# Patient Record
Sex: FEMALE | Race: WHITE | NOT HISPANIC OR LATINO | Employment: FULL TIME | URBAN - METROPOLITAN AREA
[De-identification: names, ages, dates, MRNs, and addresses within clinical notes are randomized per-mention and may not be internally consistent; named-entity substitution may affect disease eponyms.]

---
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
## 2022-01-21 ENCOUNTER — PATIENT MESSAGE (OUTPATIENT)
Dept: HEMATOLOGY/ONCOLOGY | Facility: CLINIC | Age: 22
End: 2022-01-21
Payer: COMMERCIAL

## 2022-01-26 ENCOUNTER — TELEPHONE (OUTPATIENT)
Dept: HEMATOLOGY/ONCOLOGY | Facility: CLINIC | Age: 22
End: 2022-01-26
Payer: COMMERCIAL

## 2022-01-26 NOTE — TELEPHONE ENCOUNTER
"My office will contact Dinorah to schedule her for a virtual visit with me.    Dinorah Deleon ("Dinorah") verbally provided me with her permission for me to phone/message her relative (mother, MRN [id number]) and let said relative know that Dniorah is seeing me for genetics and try to obtain permission from said relative for me to access and share with Dinorah said relative's genetic testing information and the family history information.          "

## 2022-01-31 ENCOUNTER — TELEPHONE (OUTPATIENT)
Dept: HEMATOLOGY/ONCOLOGY | Facility: CLINIC | Age: 22
End: 2022-01-31
Payer: COMMERCIAL

## 2022-01-31 NOTE — TELEPHONE ENCOUNTER
----- Message from Salomon Meyers, DNP sent at 1/26/2022  4:58 PM CST -----  Elvi Carmona,    Can you please call pt to set up a virtual visit with me at any time that works for her when I don't already have a pt? Can overbook me.     Thank you,  Salomon

## 2022-02-02 ENCOUNTER — DOCUMENTATION ONLY (OUTPATIENT)
Dept: HEMATOLOGY/ONCOLOGY | Facility: CLINIC | Age: 22
End: 2022-02-02
Payer: COMMERCIAL

## 2022-02-02 NOTE — PROGRESS NOTES
Dinorah's mother Lisa (MRN [id number]) provided me, in writing via [facility] message, with her permission for me to share with Dinorah Gonzalez's personal and family health information that Lisa previously provided to me pertaining to cancer genetics (i.e., her and her family's cancer history and other pertinent health information).    Dinorah is scheduled to meet with me next week, so the abovementioned information will be utilized at that time.

## 2022-02-04 ENCOUNTER — PATIENT MESSAGE (OUTPATIENT)
Dept: HEMATOLOGY/ONCOLOGY | Facility: CLINIC | Age: 22
End: 2022-02-04
Payer: COMMERCIAL

## 2022-02-08 ENCOUNTER — OFFICE VISIT (OUTPATIENT)
Dept: HEMATOLOGY/ONCOLOGY | Facility: CLINIC | Age: 22
End: 2022-02-08
Payer: COMMERCIAL

## 2022-02-08 DIAGNOSIS — Z84.81 FAMILY HISTORY OF BRCA2 GENE POSITIVE: ICD-10-CM

## 2022-02-08 DIAGNOSIS — Z71.83 ENCOUNTER FOR NONPROCREATIVE GENETIC COUNSELING: Primary | ICD-10-CM

## 2022-02-08 PROCEDURE — 99203 OFFICE O/P NEW LOW 30 MIN: CPT | Mod: 95,,, | Performed by: NURSE PRACTITIONER

## 2022-02-08 PROCEDURE — 99203 PR OFFICE/OUTPT VISIT, NEW, LEVL III, 30-44 MIN: ICD-10-PCS | Mod: 95,,, | Performed by: NURSE PRACTITIONER

## 2022-02-08 NOTE — PROGRESS NOTES
"Cancer Genetics  Hereditary/High Risk Clinic  Department of Hematology and Oncology  [facility]    Date of Service:  22  Provider:  Salomon Meyers DNP    Patient Information  Name: Dinorah Deleon  : 2000  MRN: [id number]     Referring Provider    Aaareferral Self  No address on file    Televisit Information  The patient location is: [place].  The chief complaint leading to consultation is: as below.  Visit type: audiovisual. Face-to-face time with patient: approximately 19 minutes.  Approximately 31 minutes in total were spent on this encounter, which includes face-to-face time and non-face-to-face time preparing to see the patient (e.g., review of tests), obtaining and/or reviewing separately obtained history, documenting clinical information in the electronic or other health record, independently interpreting results (not separately reported) and communicating results to the patient/family/caregiver, or care coordination (not separately reported).  Each patient to whom he or she provides medical services by telemedicine is:  (1) informed of the relationship between the physician and patient and the respective role of any other health care provider with respect to management of the patient; and (2) notified that he or she may decline to receive medical services by telemedicine and may withdraw from such care at any time.  Notes:     SUBJECTIVE      Chief Complaint: Genetic Evaluation    History of Present Illness (HPI):  Dinorah Deleon ("Dinorah"), 21 y.o., assigned female sex at birth, is new to the [facility] Department of H[facility]ology and On[facility] and to me.  She presents today for genetic cancer risk assessment.    Focused Medical History    Germline cancer genetic testing:  No   Cancer:     No   Colon polyp:    No h/o colonoscopy   Other benign tumor/mass:  No   Pancreatitis:    No    Breast-Specific History    Height:  5'6"   Weight:  125 lb   Breast density per BI-RADS:  No h/o " MMG   Age at menarche:  13 years   Age at first live birth:  N/A   Menopause:  premenopausal   HRT usage:  never   Personal history of breast biopsy:  no   History of XRT to chest wall:  no    Focused Surgical History   Reproductive organs intact    Ancestry   Ashkenazi Bahai ancestry:    No    Focused Family History   Consanguinity in ancestors:    No   Germline cancer genetic testing    in blood relatives:    Yes  o Dinorah's mother, MRN [id number]:  - Integrated BRACAnalysis + [employer] Hereditary Cancer Update Test, [employer], 02/2021   Accession #:  [id number]   RESULT:  POSITIVE  o BRCA2 c.5992C>T ([id number]*) heterozygous DELETERIOUS   Genes Analyzed:  APC, BRAEDEN, AXIN2, BARD1, BMPR1A, BRIP1, CDH1, CDK4, CDKN2A, CHEK2, EPCAM (large rearrangement only), HOXB13 (sequencing only), GALNT12, MLH1, MSH2, MSH3 (excluding repetitive portions of exon 1), MSH6, MUTYH, NBN, NTHL1, PALB2, PMS2, PTEN, RAD51C, RAD51D, RNF43, RPS20, SMAD4, STK11, TP53. Sequencing was performed for select regions of POLE and POLD1, and large rearrangement analysis was performed for select regions of GREM1 (see technical specifications).    Family Cancer Pedigree     MG also reportedly BRCA2 mutation-positive.   Other than noted, no known history of cancer in relatives.   Other than noted, no known family history of colon polyps.   Unknown or limited knowledge of the following relatives:  Some maternal extended family.    Review of Systems   See HPI.       OBJECTIVE      History reviewed. No pertinent past medical history.     Patient Active Problem List    Diagnosis Date Noted    Family history of BRCA2 gene positive 02/08/2022     Physical Exam  Significantly limited secondary to the inherent nature of a virtual visit.  Very pleasant patient.  Constitutional       Appearance:  She appears to be in no distress.   Neurological     Mental Status:  She is alert and oriented.  Psychiatric        Mood and Affect:  She  has a normal mood and affect.     Thought Content:  Thought content is normal.         Speech:  Speech is normal.     Behavior:  Behavior is normal.     Judgment:  Judgment is normal.   Genetics-specific     It is my assessment that the patient is ready to proceed with cancer-genetic testing from a psychosocial perspective.    ClinVar    NM_000059.4(BRCA2):c.5992C>T (p.[id number])  Interpretation:  Pathogenic?    COUNSELING      Causes of cancer    Germline cancer genetic testing is the testing of genes associated with cancer, known as cancer susceptibility genes.  Just as these genes are inherited from parents, mutations in these genes can be inherited, as well.  A mutation in a cancer susceptibility gene adversely affects the gene's ability to prevent cancer; therefore, carriers of cancer susceptibility gene mutations may be at increased risk for certain cancers.    Only a small percentage of cancers are caused by a cancer susceptibility gene mutation, meaning the cancer is genetic/hereditary; rather, most cancers are sporadic.  Causes of sporadic cancers may include environmental risk factors, lifestyle risk factors, and non-modifiable risk factors.  It is important to note that members of a family often share not only their genetics but also risk factors including environmental and lifestyle risk factors, so cancers can be familial.    Potential results of genetic testing, and their implications    Potential results of genetic testing include positive, negative, and variant of unknown significance (VUS).     A positive result indicates the presence of at least one clinically significant mutation, and the patient's associated cancer risks vary depending upon the cancer susceptibility gene(s) in which there is/are a mutation(s).  With a positive result, in some cases, depending upon the specific result and the patient's clinical history, modified risk management may be recommended, including measures for risk  reduction and/or surveillance; however, modified management is not always an option.     A negative result indicates that no clinically significant mutations were identified in the gene(s) tested.     A VUS indicates that there is not presently enough data for the laboratory to make a determination as to whether the variant is clinically significant.  VUSs are not typically acted upon clinically.      The ability to interpret the meaning of a negative genetic testing result in genes associated with cancer with which the patient has not personally been affected, when done prior to testing the appropriate affected relative(s), is significantly limited.  A negative result in the patient does not indicate that she cannot develop cancer, and, in fact, the patient may even be at increased risk for cancer based on shared risk factors with affected relatives.      Genetic mutation inheritance    If Dinorah tests positive for a mutation, her first-degree relatives would each have a 50% chance of having the same mutation, and other, more distantly related blood-relatives would also be at risk of having the same mutation.      BRCA2    There is a 50% chance that Dinorah has inherited her mother's germline BRCA2 mutation.  Germline BRCA2 mutations are associated with increased risk of breast cancer, ovarian cancer, pancreatic cancer, and melanoma of the eyes and the skin.  Germline BRCA2 mutations are also associated with the autosomal-recessively inherited, rare but serious condition Fanconi anemia, which can develop when both parents have one BRCA2 mutation--In that case, there is a 25% chance that the offspring will inherit both parents' BRCA2 mutations.  If Dinorah tests positive for a BRCA2 mutation, reproductive implications will be further discussed.     Genetic discrimination    The Genetic Information Nondiscrimination Act (DANE) is U[place]. federal legislation that provides some protections against use of an  individual's genetic information by their health insurer and by their employer.  Title I of DANE prohibits most health insurers from utilizing an individual's genetic information to make decisions regarding insurance eligibility or premium charges.  Title II of DANE prohibits covered entities, including employers, from requesting the genetic information of employees and applicants.  DANE does not protect individuals from genetic discrimination toward health insurance obtained through a job with the  or through the Federal Employees Health Benefits Plan; from genetic discrimination by employers with fewer than 15 employees or if employed by the ; or from genetic discrimination by any other type of policies/entities, including but not limited to life insurance, disability insurance, long-term care insurance,  benefits, and  Health Services benefits.     Genetic testing logistics    An outside laboratory would perform the testing after a blood sample is collected here at the [facility] or a saliva sample is collected by the patient at home.  With genetic testing, there is a potential for the patient to incur out-of-pocket costs.       Assessment/Plan    Based on the information provided by Dinorah, she meets current criteria for genetic testing of BRCA2 according to current clinical guidelines given her mother carries a mutation in said gene.  Of note, Dinorah has limited family structure in her paternal lineage.  Offered Dinorah germline cancer-genetic testing at this time versus deferring testing at this time or declining testing altogether.  Dinorah desires to proceed with germline cancer-genetic testing at this time and has verbally provided her informed consent to proceed.  Various test panel options were discussed, and she prefers to test a broad panel of genes.  Dinorah is aware that I will ship her saliva kit to her once I receive her completed informed-consent  form.    Questions were encouraged and answered to the patient's satisfaction, and she verbalized understanding of information and agreement with the plan.       ASSESSMENT/PLAN      Dinorah was seen today for genetic evaluation.    Diagnoses and all orders for this visit:    Encounter for nonprocreative genetic counseling  Genetic cancer risk assessment and pre-test cancer genetic counseling were conducted.  Based on the information provided by Dinorah, she meets current criteria for genetic testing of BRCA2 according to current clinical guidelines given her mother carries a mutation in said gene.  Of note, Dinorah has limited family structure in her paternal lineage.  Offered Dinorah germline cancer-genetic testing at this time versus deferring testing at this time or declining testing altogether.  Dinorah desires to proceed with germline cancer-genetic testing at this time and has verbally provided her informed consent to proceed.  Various test panel options were discussed, and she prefers to test a broad panel of genes.  Dinorah is aware that I will ship her saliva kit to her once I receive her completed informed-consent form.    Family history of BRCA2 gene positive  As above.    Follow-up:  Follow up for post-test genetic counseling, if indicated or if patient desires.    Salomon Meyers, DNP, APRN, FNP-BC, AOCNP  Nurse Practitioner, Hereditary/High Risk Clinic  Hematology/Oncology, [facility]

## 2022-02-08 NOTE — PROGRESS NOTES
REFERENCES     1. National Center for Biotechnology Information. ClinVar; [[id number]], https://www.ncbi.nlm.nih.gov/clinvar/variation/BRT387881674.10 (accessed Feb. 8, 2022).  2. National Comprehensive Cancer Network (NCCN). (2021). Genetic/familial high-risk assessment: Breast, ovarian, and pancreatic. NCCN Clinical Practice Guidelines in Oncology (NCCN Guidelines), Version 1.2022.  3. National Comprehensive Cancer Network (NCCN). (2021). Breast cancer screening and diagnosis. NCCN Clinical Practice Guidelines in Oncology (NCCN Guidelines), Version 1.2021.

## 2022-03-17 ENCOUNTER — PATIENT MESSAGE (OUTPATIENT)
Dept: HEMATOLOGY/ONCOLOGY | Facility: CLINIC | Age: 22
End: 2022-03-17
Payer: COMMERCIAL

## 2022-03-23 ENCOUNTER — TELEPHONE (OUTPATIENT)
Dept: HEMATOLOGY/ONCOLOGY | Facility: CLINIC | Age: 22
End: 2022-03-23
Payer: COMMERCIAL

## 2022-03-23 DIAGNOSIS — Z84.81 FAMILY HISTORY OF BRCA2 GENE POSITIVE: Primary | ICD-10-CM

## 2022-03-23 NOTE — TELEPHONE ENCOUNTER
"Advised Dinorah Deleon ("Dinorah") verbally, via telephone, that her mother requested a status update as to Dinorah's genetic testing.  Dinorah verbally provided me with her permission for me to advise her mother MRN ([id number]) in mother's portal as to updates regarding Dinorah's genetic testing status.      "

## 2022-04-28 ENCOUNTER — TELEPHONE (OUTPATIENT)
Dept: HEMATOLOGY/ONCOLOGY | Facility: CLINIC | Age: 22
End: 2022-04-28
Payer: COMMERCIAL

## 2022-04-28 NOTE — TELEPHONE ENCOUNTER
Phoned pt with genetic testing results.  Confirmed pt's email as [email or web address].  Pt requested that her mother not be told of pt's genetic testing results as pt will tell mother herself; assured pt that of course I will honor request.  Advised pt that my office will contact her to set up her post-test visit and that only BRCA1 and BRCA2 have resulted so far and remainder of requested genes still in process.  Pt verbalized understanding and agreement.

## 2022-04-29 ENCOUNTER — TELEPHONE (OUTPATIENT)
Dept: HEMATOLOGY/ONCOLOGY | Facility: CLINIC | Age: 22
End: 2022-04-29
Payer: COMMERCIAL

## 2022-04-29 NOTE — TELEPHONE ENCOUNTER
Spoke with pt to schedule expedited genetics appt. Pt will call back Monday to schedule because she lives in [place] and her mom would like to attend appt.

## 2022-05-02 ENCOUNTER — TELEPHONE (OUTPATIENT)
Dept: HEMATOLOGY/ONCOLOGY | Facility: CLINIC | Age: 22
End: 2022-05-02
Payer: COMMERCIAL

## 2022-05-02 NOTE — TELEPHONE ENCOUNTER
Left vm to schedule genetics appt. Provided call back number.    ----- Message from Esther Haley MA sent at 5/2/2022 11:33 AM CDT -----    ----- Message -----  From: Cassie Garcia  Sent: 5/2/2022  11:31 AM CDT  To: Lu[facility] Staff    Pt#[phone]    Pt is returning call regarding scheduling genetic testing

## 2022-05-03 ENCOUNTER — TELEPHONE (OUTPATIENT)
Dept: HEMATOLOGY/ONCOLOGY | Facility: CLINIC | Age: 22
End: 2022-05-03
Payer: COMMERCIAL

## 2022-05-03 ENCOUNTER — PATIENT MESSAGE (OUTPATIENT)
Dept: HEMATOLOGY/ONCOLOGY | Facility: CLINIC | Age: 22
End: 2022-05-03
Payer: COMMERCIAL

## 2022-05-03 NOTE — TELEPHONE ENCOUNTER
Phoned pt with additional genetic testing results -- No answer, and VM box did not .  Will message pt.

## 2022-06-13 ENCOUNTER — DOCUMENTATION ONLY (OUTPATIENT)
Dept: HEMATOLOGY/ONCOLOGY | Facility: CLINIC | Age: 22
End: 2022-06-13
Payer: COMMERCIAL

## 2022-06-16 ENCOUNTER — TELEPHONE (OUTPATIENT)
Dept: HEMATOLOGY/ONCOLOGY | Facility: CLINIC | Age: 22
End: 2022-06-16
Payer: COMMERCIAL

## 2022-06-16 PROBLEM — Z15.09 MONOALLELIC MUTATION OF BRCA2 GENE: Status: ACTIVE | Noted: 2022-06-16

## 2022-06-16 PROBLEM — Z15.89: Status: ACTIVE | Noted: 2022-06-16

## 2022-06-16 PROBLEM — Z15.01 MONOALLELIC MUTATION OF BRCA2 GENE: Status: ACTIVE | Noted: 2022-06-16

## 2022-06-16 NOTE — PROGRESS NOTES
"Cancer Genetics  Hereditary and High-Risk Clinic  Department of Hematology and Oncology  [facility]    Date of Service:  22  Visit Provider:  Salomon Meyers DNP  Collaborating Physician:  Savanah Casey MD    Patient Information  Name: Dinorah Deleon  : 2000  MRN: [id number]     SUBJECTIVE      Chief Complaint: Results    History of Present Illness (HPI):  Dinorah Deleon ("Dinorah"), 21 y.o., assigned female sex at birth, returns today for post-test cancer genetic counseling.  She underwent hereditary cancer susceptibility gene testing after meeting with me earlier this year, and this revealed that she carries two pathogenic gene variants--one in her BRCA2 gene, and one in her VHL gene.    Focused Medical History   Germline genetic testing:  Yes, once - see below           Genes analyzed:  AIP, ALK, APC*, BRAEDEN*, AXIN2, BAP1, BARD1, BLM, BMPR1A, BRCA1, BRCA2, BRIP1, CASR, CDC73, CDH1, CDK4, CDKN1B, CDKN1C, CDKN2A (p14ARF), CDKN2A ([id number]), CEBPA, CHEK2, CTNNA1, DICER1*, DIS3L2, EGFR, EPCAM*, FH*, FLCN, GATA2, GPC3*, GREM1*, HOXB13, HRAS, KIT, MAX*, MEN1*, MET*, MITF*, MLH1*, MSH2*, MSH3*, MSH6*, MUTYH, NBN, NF1*, NF2, NTHL1, PALB2, PDGFRA, PHOX2B*, PMS2*, POLD1*, POLE, POT1, OKRIG5V, PTCH1, PTEN*, RAD50, RAD51C, RAD51D, RB1*, RECQL4*, RET, RUNX1, SDHA*, SDHAF2, SDHB, SDHC*, SDHD, SMAD4, SMARCA4, SMARCB1, SMARCE1, STK11, SUFU, TERC, TERT, UTII434, TP53, TSC1*, TSC2, VHL, WRN*, WT1      Cancer:  No   Other benign tumor/mass:  No   Pancreatitis:  No    Focused Surgical History   Reproductive organs intact    Family Cancer History/Pedigree     Hereditary cancer genetic testing:  Yes - see in pedigree   Benign tumors:  None known, but would not know    Review of Systems   See HPI.     When questioned about and advised as to tinnitus, patient endorsed occasional ringing in either ear, occurring for as long as she can remember and not getting more frequent or intense as " of late, and not more significant in one ear than the other.  o The clinical course is not suggestive of pathology given its chronic, intermittent, bilateral, stable nature.   Recent or remote history of hearing loss, vertigo, dizziness, lightheadedness, lower abdominal or pelvic pain, dyspareunia, menstrual periods lasting for longer than 7 days, menstrual periods requiring pad/tampon change more frequently than every 2 hours, or passing clot with menstrual period.   Endorses asymmetrical breasts, longstanding, but more significant within past year.   Denies having been experiencing pelvic symptoms, lower abdominal/gastrointestinal (GI) symptoms, upper GI symptoms, or unexplained weight loss or gain.   Denies having been experiencing balance issue.   Denies ever having had the features of VHL disease indicated below in the Assessment/Plan section.   Denies having been experiencing any other symptoms, from head to toe.     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  Unaccompanied in exam room.  Preferred to have relative in waiting room.  Vitals signs:  Reviewed:  Vitals:    06/20/22 1145   PainSc: 0-No pain   Distress score:  Reviewed: (1/10).  Constitutional      Appearance:  She appears well developed and well nourished. No distress.   Pulmonary     Effort:  Pulmonary effort is normal.   Neurological     Mental Status:  She is alert and oriented.     Coordination:  Coordination is normal.   Psychiatric         Mood and Affect:  She has a normal mood and affect.     Thought Content:  Thought content is normal.         Speech:  Speech is normal.     Behavior:  Behavior is normal.     Judgment:   "Judgment is normal.     ASSESSMENT/PLAN      BRCA2 Gene Mutation    Results  · Dinorah carries a single (i.e., heterozygous or monoallelic) pathogenic variant (henceforth herein referred to as a mutation) in her BRCA2 gene; specifically, she carries BRCA2 c.5992C>T ([id number]*), heterozygous.    Risks  · BRCA2 mutations are associated with the following risks:  · Autosomal-dominantly inherited risks, meaning that a mutation on only one copy of the BRCA2 gene (and not on both copies of the BRCA2 gene) is needed for the risks to apply:  · Female breast cancer:  Absolute risk of >60%; estimated cumulative lifetime risk of approximately 69%,  Estimated cumulative lifetime risk of a second, contralateral breast cancer of 26%.  Predisposition to estrogen receptor-positive (ER+) disease.  · Male breast cancer:  Estimated cumulative lifetime risk of approximately 7%-8%.  · Prostate cancer:  Estimated cumulative lifetime risk of approximately 60%.  · Ovarian, fallopian tube, and primary peritoneal cancers:  Absolute risk of approximately 13%-29%; estimated cumulative lifetime risk by age 70 is approximately 20%.  · Pancreatic cancer:  Absolute risk of approximately 5%-10%.  · Ocular (eye) melanoma:  Relative risk is very high compared to non-mutation carriers; however, absolute risk remains very low.  · Cutaneous (skin) melanoma:  "Some" evidence of an association.   · Less well established associations:  · Leukemia:  One study demonstrated that women carrying a BRCA2 mutation have an increased risk for leukemia, particularly after chemotherapy.  · Gastric cancer:  One database has demonstrated that BRCA2 mutation carriers with a family history of breast and ovarian cancer have an increased risk of developing gastric cancer by age 70.  · Autosomal-recessively inherited risk, meaning that both copies of the BRCA2 gene need to have a mutation in order for the risk to apply:  · Fanconi anemia, a condition characterized by " developmental abnormalities of major organ systems, early-onset failure of the bone marrow, and high predisposition to cancer, with a cumulative probability of developing a cancer of 97% by age 6.    Risk Management  · Female breast cancer:  If breast(s) remain intact:  1. Breast awareness, including periodic (monthly is generally an appropriate interval) breast self-exam (BSE), with prompt reporting to the breast speciality any changes or concerns.  Breast awareness is to begin at age 18.  2. Clinical breast exam (CBE), every 6 months, starting at age 25.  3. Breast MRI with contrast, annually, starting at age 25; this is to be started earlier if a breast cancer diagnosis before age 30 is present in the family, typically starting at 5-10 years younger than the earliest diagnosis.  4. Mammogram with tomosynthesis, annually, starting at age 30.  5. Consider the option of risk-reducing mastectomy (RRM), which significantly reduces the risk of breast cancer.  6. Consider the option of risk-reducing medication (known as chemoprevention) for breast cancer risk reduction.  This is generally not initiated until at least age 35 and when childbearing is complete.  If status post-bilateral mastectomy:  1. Breast awareness, including periodic (monthly is generally an appropriate interval) breast self-exam (BSE), with prompt reporting to the breast speciality any changes or concerns.  Breast awareness is to begin at age 18.  2. Clinical breast exam (CBE), every 12 months, starting at age 25.  · Ovarian, fallopian tube, and primary peritoneal cancers:  If ovaries or fallopian tubes remain intact:  1. Risk-reducing salpingectomy-oophorectomy (RRSO), when childbearing has been completed.  Typically, RRSO would occur between ages 35 and 40; however, it is reasonable to delay RRSO until age 40-45 unless family history warrants performing RRSO at an earlier age.  While RRSO significantly reduces associated cancer risk, a small risk  "of developing a primary peritoneal cancer remains.  · Of note, in premenopausal females, oophorectomy likely decreases breast cancer risk.  · Consider concurrent hysterectomy at the time of BSO, as females with no uterus are then candidates for estrogen-alone hormone replacement therapy (HRT), which is associated with a decreased breast cancer risk compared to the combined estrogen-and-progesterone HRT that is required if HRT is needed once the ovaries are removed and the uterus remains intact.  2. Prior to RRSO--or if the patient has opted out of RRSO--transvaginal ultrasound (TVUS) combined with measurement of serum CA-125, starting at age 30-35, and typically repeated every 6 months for screening purposes.  This "screening" is of uncertain benefit.  3. Prompt reporting to the gynecology provider any lower abdominal or pelvic symptoms or unexplained weight loss or gain.  4. Consider the option of risk-reducing medication (known as chemoprevention) for ovarian cancer risk reduction, which is commonly an oral contraceptive pill (OCP).    · Pancreatic cancer:  1. If there is a first- or second-degree relative on the same side of the family as the BRCA2 mutation, consider pancreatic cancer screening beginning at age 50 years or at 10 years younger than the youngest pancreatic cancer diagnosis in the family--whichever is earlier.  Such screening would typically consist of annual MRI/MRCP [magnetic resonance cholangiopancreatography] and/or endoscopic ultrasound (EUS).  2. Prompt reporting to the pancreas specialist any upper or lower gastrointestinal (GI) symptoms or unexplained weight loss or gain.  · Ocular (eye) melanoma:    1. Annual eye exam.  2. Minimization of ultraviolet (UV) exposure to the eyes.  · Cutaneous (skin) melanoma:    1. Annual total-body skin exam (TBSE).  2. Minimization of ultraviolet (UV) exposure to the eyes.  3. Prompt reporting to the dermatology provider any changes or concerns noted on " "self-skin exam, which should be assisted for the hard-to-visualize body areas.  · Screening for any potential BRCA2 mutation-associated leukemia or gastric cancer is not indicated at this time.    Reproductive Implications  · When both reproductive partners carry any mutation in their BRCA2 gene, each of their offspring has a 25% chance of inheriting both parents' BRCA2 mutations, thereby being a "biallelic BRCA2 mutation carrier," which places that offspring at significant risk for Fanconi anemia.  · Counseling for reproductive options, which may include prenatal genetic diagnosis and/or assisted reproduction with preimplantation genetic testing, is indicated for individuals/couples expressing concern over future offspring's BRCA2-associated risks.     Risks to Relatives  · It is suspected that this BRCA2 mutation was inherited from a parent; this suspicion can reasonably be validated when one parent tests positive for the same mutation and the other parent tests negative for that mutation.  · Children and siblings of a BRCA2 monoallelic/heterozygous mutation carrier each independently have a 50% chance of inheriting or having inherited the same BRCA2 mutation, thereby having a predisposition to BRCA2 mutation-associated cancers.  · Both males and females can carry, be affected by, and pass down these mutations.  · When passed down, these mutations are passed directly from parent to child and do not "skip generations."  · When both reproductive partners carry any mutation in their BRCA2 gene, each of their offspring has a 25% chance of inheriting both parents' BRCA2 mutations, thereby being at significant risk for Fanconi anemia.  · Counseling for reproductive options, which may include prenatal genetic diagnosis and/or assisted reproduction with preimplantation genetic testing, is indicated for individuals/couples expressing concern over future offspring's BRCA2-associated risks.     Plan  1. For breast cancer " "risk management:  Dinorah is to practice breast awareness as described above.  Given her current age, it is appropriate for Dinorah to undergo annual clinical breast exam (CBE), and for the next few years this can be done by her established gynecology provider, Dr. Jayleen Esqueda, whom patient is to see annually for this purpose (in addition to visits needed for gynecologic care).  Dinorah's last visit with Dr. Esqueda appears to have been a little over two years ago, and Dinorah knows to make an appointment for the very near future.  We will plan to begin every-6-month CBEs sometime between ages 21 and 25.  Risk-reducing mastectomy (RRM) is an option for Dinorah at any point and reduces absolute breast cancer risk substantially but does not completely eliminate it.  Dinorah notes that, while one breast has always been larger than the other, within the past year the difference has increased to "almost a full cup size"; for this reason, she is leaning toward RRM at some point, but not right now, and knows to reach out to me when she is ready to meet with a breast surgeon for RRM consultation.  Although an individual's two breasts can physiologically be different sizes, a consultation with a breast specialist is recommended at this time for evaluation, and if discharged from that clinic, Dinorah can resume annual CBEs with her gynecology provider as indicated just above.  Moving forward, with any breast-related change or concern, Dinorah is to promptly notify her breast specialist or me.  While chemoprevention for breast cancer risk reduction can significantly reduce breast cancer risk, it is not typically prescribed until at least age 35 and then typically only if the breasts remain intact.  I will send Dinorah a message through her patient portal, after today's visit, with general information on breast cancer risk reduction.  2. For ovarian/fallopian tube/primary peritoneal cancer risk management:  As " "abovementioned, Elsah is established with gynecology provider Dr. Jayleen Esqueda, with whom Dinorah is to schedule a near-future appointment as she is due for routine gynecologic care as well as a clinical breast exam (CBE).  Dinorah is to notify Dr. Esqueda of her BRCA2 gene mutation, and, moving forward, Dinorah is to promptly notify Dr. Esqueda or me of any pelvic or lower abdominal/gastrointestinal (GI) symptoms or any unexplained weight loss or gain.   3. For pancreatic cancer risk management:  No known family history of pancreatic cancer in a first- or second-degree relative; therefore, no screening indicated at this time.  Dinorah is to notify me of any upper or lower gastrointestinal (GI) symptoms, unexplained weight loss or gain, or relatives diagnosed with pancreatic cancer moving forward.  4. For ocular melanoma risk management:  Dinorah would like a referral to an eye doctor; I have placed a referral to [facility] and have asked the [facility] to reach out to Dinorah.  She is to undergo an eye exam annually and ensure that her eye doctor is aware of her BRCA2 gene mutation.  Additionally, she is to minimize her ultraviolet (UV) exposure.  5. For cutaneous melanoma risk management:  Dinorah would like a referral to a dermatology provider; I have placed a referral to [facility] and have asked the [facility] to reach out to Dinorah.  She is to undergo a total-body skin exam annually (TBSE) with the dermatology provider, practice skin exams, and minimize her ultraviolet (UV) exposure.  6. For reproductive purposes:  When family planning, BRCA2 carrier testing of the reproductive partner is recommended, and steps that can potentially be taken if said partner is positive were discussed today.  7. For the family:  A BRCA2 "family notification letter" is being sent via [facility] to the patient, for the patient to share with her relatives.  8. Follow up for BRCA2 check-in in 1 year " and every few years thereafter.    VHL Gene    Results  · Dinorah carries a single (i.e., heterozygous or monoallelic) pathogenic variant (henceforth herein referred to as a mutation) in her VHL gene; specifically, she carries VHL c.598C>T ([id number]) heterozygous.    Risks  · VHL mutations are associated with the following risks:  · Autosomal-dominantly inherited risk, meaning that a mutation on only one copy of the VHL gene (and not on both copies of the VHL gene) is needed for the risk to apply:  · Von Hippel-Lindau (VHL) disease; however, association of the VHL c.598C>T mutation with VHL disease is unclear.  Features of VHL disease include:  · Major features:  · Hemangioblastomas (benign tumors arising from blood vessel lining) of the retina, spine, or brain;  · Clear cell renal cell carcinoma (ccRCC) diagnosed at age <40, or multiple/bilateral ccRCC tumors diagnosed at any age;  · Pheochromocytoma, a neuroendocrine tumor (NET) that grows from chromaffin cells on the adrenal glands (which are located atop the kidneys);  · Abdomen, thorax, or neck paraganglioma, a NET that forms near certain blood vessels and nervous outside of the adrenal glands; and  · Retinal angiomas.  · Minor features:  · Endolymphatic sac tumors, a rare but locally aggressive tumor located in the inner ear;  · Papillary cystadenomas of the epididymis (a duct behind the testis) or broad ligament (the layer of tissue connecting the uterus to the pelvic floor);  · More than one pancreatic serous cystadenoma; and  · More than one pancreatic neuroendocrine tumor (PNET), or multiple pancreatic cysts.  · Autosomal-recessively inherited risk(s), meaning that both copies of the VHL gene need to have a mutation in order for the risk(s) to apply:  · Familial erythrocytosis, type 2 (ECYT2), a rare condition characterized by having excess erythrocytes (red blood cells) and increased serum levels of erythropoietin.  ECYT2 is recognized as being  "associated with the VHL c.598C>T mutation.  Individuals with ECYT2 have a high risk for peripheral thrombosis and cerebrovascular events.    Risk Management   · VHL disease (Risk Management varies by age group, and the below is based on current age of 21 years):  1. VHL history assessment, annually.  2. Physical exam by a provider aware of VHL, annually.  3. Assessment or plasma metanephrines or of urinary metanephrines using 24-hour urine testing, annually.  4. One-time MRI of the brain, at age 15+, with thin cuts through the inner ear/petrous temporal bones (internal auditory canal) to assess for endolymphatic sac tumor.  5. MRI of the abdomen, with and without IV contrast, to assess kidneys, pancreas, and adrenals, every 2 years, starting at age 15.  · If there is a relative with an early diagnosis, screening should begin 10 years before their age of diagnosis.  · Imaging frequency would be increased if lesions are detected.  Patient with tumors approaching 3 cm in size should undergo partial nephrectomy (or ablative therapy if nephrectomy is contraindicated).  · Women of childbearing age who are planning to become pregnant should consider undergoing renal imaging prior to conceiving.  6. MRI of the brain and cervical, thoracic, and lumbar spine, with and without contrast, every 2 years.  7. Eye/retinal exam with indirect ophthalmoscopy by an ophthalmologist aware of VHL, using a dilated exam, every 6-12 months.  8. Audiogram by an audiologist, every 2 years.  · ECYT2:  1. If the individual is affected and not just a carrier, would refer to Hematology/Oncology for management.  2. See Reproductive Implications section below.    Reproductive Implications  · When both reproductive partners carry any mutation in their VHL gene, each of their offspring has a 25% chance of inheriting both parents' VHL mutations, thereby being a "biallelic VHL mutation carrier," which places that offspring at significant risk for " "familial erythrocytosis, type 2 (ECYT2).  · Counseling for reproductive options, which may include prenatal genetic diagnosis and/or assisted reproduction with preimplantation genetic testing, is indicated for individuals/couples expressing concern over future offspring's VHL-associated risks.     Risks to Relatives  · It is suspected that this VHL mutation was inherited from a parent; this suspicion can reasonably be validated when one parent tests positive for the same mutation and the other parent tests negative for that mutation.  · Children and siblings of a VHL monoallelic/heterozygous mutation carrier each independently have a 50% chance of inheriting or having inherited the same VHL mutation, thereby having a predisposition to VHL mutation-associated conditions.  · Both males and females can carry, be affected by, and pass down these mutations.  · When passed down, these mutations are passed directly from parent to child and do not "skip generations."  · When both reproductive partners carry any mutation in their VHL gene, each of their offspring has a 25% chance of inheriting both parents' VHL mutations, thereby being at significant risk for familial erythrocytosis, type 2 (ECYT2).  · Counseling for reproductive options, which may include prenatal genetic diagnosis and/or assisted reproduction with preimplantation genetic testing, is indicated for individuals/couples expressing concern over future offspring's VHL-associated risks.     Plan  1. For all VHL disease risk management:  Dinorah and I discussed that her particular VHL variant has very little evidence of association with VHL disease and that, therefore, we can proceed in one of several ways:  · Perform no VHL disease screening for Dinorah at this time; or  · Perform baseline VHL disease screenings as described above for Dinorah at this time/in the near future; or  · Perform VHL disease screenings, as described above, for Dinorah .  Based on " "Dinorah's lack of known family history of VHL-associated conditions, I am not strongly suspicious that she would have VHL disease.  I believe that the option of performing baseline VHL screenings as described above, at this time/in the near future, is a reasonable course of action, particularly given the fact that if a VHL-associated finding were identified, we may be more suspicious that this variant is pathogenic for VHL disease; Dinorah is open to my recommendations and amenable to the baseline screening option, and I am going to further discuss this with my collaborating physician and follow up with Dinorah.  I offered Dinorah blood work for plasma metanephrine management today, and she declined, as she does not like having her blood drawn and would prefer time to mentally prepare for such.  2. For the family:  A VHL "family notification letter" is being sent via [facility] to the patient, for the patient to share with her relatives.  3. Follow up for VHL check-in in 1 year.      Visit Diagnoses and Plan:    1. Encounter for nonprocreative genetic counseling  - Discussed/provided the patient with the above information.    2. Monoallelic mutation of BRCA2 gene  - Ambulatory referral/consult to Breast Surgery; Future  - Ambulatory referral/consult to Ophthalmology; Future  - Ambulatory referral/consult to Dermatology; Future    3. Monoallelic mutation of VHL gene  - Ambulatory referral/consult to Internal Medicine; Future    4. Asymmetrical breasts  - Ambulatory referral/consult to Breast Surgery; Future    5. General medical exam  - Ambulatory referral/consult to Internal Medicine; Future    Follow-up:  Follow up in about 1 year (around 6/20/2023) for BRCA2 and VHL check-in.    Approximately 74 minutes were spent face-to-face with the patient.  Approximately 209 minutes in total were spent on this encounter, which includes face-to-face time and non-face-to-face time preparing to see the patient (e.g., review " of tests), obtaining and/or reviewing separately obtained history, documenting clinical information in the electronic or other health record, independently interpreting results (not separately reported) and communicating results to the patient/family/caregiver, or care coordination (not separately reported).     Salomon Meyers, DNP, APRN, FNP-BC, AOCNP  Nurse Practitioner, Hereditary and High-Risk Clinic  Department of Hematology and Oncology  [facility]

## 2022-06-16 NOTE — TELEPHONE ENCOUNTER
Attempted to call and confirm, she did not answer. Left a brief message with my direct call back number.    ----- Message from Salomon Meyers DNP sent at 6/13/2022  2:47 PM CDT -----  Esther, can you please call to confirm pt's post-test appt for 6/20/22? Thank you!

## 2022-06-16 NOTE — PROGRESS NOTES
REFERENCES     1. Centers for Disease Control and Prevention (CDC). (19 April 2022). Age 21 Minimum Legal Drinking Age. Alcohol and Public Health. Retrieved on June 20, 2022, from https://www.cdc.gov/alcohol/fact-sheets/minimum-legal-drinking-age.htm#:~:text=Minimum%20Legal%20Drinking%20Age%20(MLDA)%20laws%20specify%20the%20legal%20age,[place]%20[place]%20is%2021%20years.  2. [facility]. (2022). # 200810 Erythrocytosis, familial, 2; ECYT2. Online Mendelian Inheritance in Man. Retrieved on June 16, 2022, from https://omim.org/entry/530163  3. National Center for Biotechnology Information. ClinVar; [SYN407808671.19], https://www.ncbi.nlm.nih.gov/clinvar/variation/VVC468222263.19 (accessed June 16, 2022).  4. National Comprehensive Cancer Network (NCCN). (2021). Kidney cancer. NCCN Clinical Practice Guidelines in Oncology (NCCN Guidelines), Version 4.2022.  5. National Comprehensive Cancer Network (NCCN). (2022). Genetic/familial high-risk assessment: Breast, ovarian, and pancreatic. NCCN Clinical Practice Guidelines in Oncology (NCCN Guidelines), Version 2.2022.  6. National Library of Medicine (NLM). (01 August 2012). Familial erythrocytosis. MedlinePlus. Retrieved on June 16, 2022, from https://medlineplus.gov/genetics/condition/familial-erythrocytosis/   7. National Library of Medicine (NLM). (n.d.). [place] polycythemia. GTR: Genetic Testing Registry. Retrieved on June 16, 2022, from https://www.ncbi.nlm.nih.gov/gtr/conditions/U8339640/?_ga=2.485056720.772914[id number]  8. DOMI Roberts, & LENARD Carbone (16 June 2022). Clinical features, diagnosis, and management of von Hippel-Lindau disease. In M. B. ERIN Wisdom R. D. RACIEL Lott, DAVID Ybarra, & DOMI Grullno (Eds.), UpToDate.  9. DAVID Flores, & ANTHONY Wise (May 2022). Hemoglobin variants that alter hemoglobin-oxygen affinity. In LEONORA English Jr., & CHRISTIAN Ma (Eds.), UpToDate.

## 2022-06-17 ENCOUNTER — TELEPHONE (OUTPATIENT)
Dept: HEMATOLOGY/ONCOLOGY | Facility: CLINIC | Age: 22
End: 2022-06-17
Payer: COMMERCIAL

## 2022-06-17 NOTE — TELEPHONE ENCOUNTER
01/25/2022 @ ~1530 per pt OK to speak with mother. Subsequently I phoned pt's mother, who gave me permission to share her genetics info with [employer].  
Note:  01/25/2022 @ ~1530 (More recent information available)  Per pt, OK to speak with mother for purposes of pt's visit with me. Subsequently I phoned pt's mother, who gave me permission to share her genetics info with [employer].  
Yes/1/4 ppd, 6pyhx

## 2022-06-20 ENCOUNTER — TELEPHONE (OUTPATIENT)
Dept: OPTOMETRY | Facility: CLINIC | Age: 22
End: 2022-06-20
Payer: COMMERCIAL

## 2022-06-20 ENCOUNTER — PATIENT MESSAGE (OUTPATIENT)
Dept: HEMATOLOGY/ONCOLOGY | Facility: CLINIC | Age: 22
End: 2022-06-20
Payer: COMMERCIAL

## 2022-06-20 ENCOUNTER — OFFICE VISIT (OUTPATIENT)
Dept: HEMATOLOGY/ONCOLOGY | Facility: CLINIC | Age: 22
End: 2022-06-20
Payer: COMMERCIAL

## 2022-06-20 ENCOUNTER — PATIENT MESSAGE (OUTPATIENT)
Dept: HEMATOLOGY/ONCOLOGY | Facility: CLINIC | Age: 22
End: 2022-06-20

## 2022-06-20 DIAGNOSIS — Z15.89 MONOALLELIC MUTATION OF VHL GENE: ICD-10-CM

## 2022-06-20 DIAGNOSIS — Z15.09 MONOALLELIC MUTATION OF BRCA2 GENE: ICD-10-CM

## 2022-06-20 DIAGNOSIS — Z15.01 MONOALLELIC MUTATION OF BRCA2 GENE: ICD-10-CM

## 2022-06-20 DIAGNOSIS — N64.89 ASYMMETRICAL BREASTS: ICD-10-CM

## 2022-06-20 DIAGNOSIS — Z00.00 GENERAL MEDICAL EXAM: ICD-10-CM

## 2022-06-20 DIAGNOSIS — Z71.83 ENCOUNTER FOR NONPROCREATIVE GENETIC COUNSELING: Primary | ICD-10-CM

## 2022-06-20 PROBLEM — N80.9 ENDOMETRIOSIS: Status: ACTIVE | Noted: 2022-06-20

## 2022-06-20 PROBLEM — Z84.81 FAMILY HISTORY OF BRCA2 GENE POSITIVE: Status: RESOLVED | Noted: 2022-02-08 | Resolved: 2022-06-20

## 2022-06-20 PROCEDURE — 99417 PR PROLONGED SVC, OUTPT, W/WO DIRECT PT CONTACT,  EA ADDTL 15 MIN: ICD-10-PCS | Mod: S$GLB,,, | Performed by: NURSE PRACTITIONER

## 2022-06-20 PROCEDURE — 1159F MED LIST DOCD IN RCRD: CPT | Mod: CPTII,S$GLB,, | Performed by: NURSE PRACTITIONER

## 2022-06-20 PROCEDURE — 99999 PR PBB SHADOW E&M-EST. PATIENT-LVL III: ICD-10-PCS | Mod: PBBFAC,,, | Performed by: NURSE PRACTITIONER

## 2022-06-20 PROCEDURE — 99417 PROLNG OP E/M EACH 15 MIN: CPT | Mod: S$GLB,,, | Performed by: NURSE PRACTITIONER

## 2022-06-20 PROCEDURE — 99999 PR PBB SHADOW E&M-EST. PATIENT-LVL III: CPT | Mod: PBBFAC,,, | Performed by: NURSE PRACTITIONER

## 2022-06-20 PROCEDURE — 1159F PR MEDICATION LIST DOCUMENTED IN MEDICAL RECORD: ICD-10-PCS | Mod: CPTII,S$GLB,, | Performed by: NURSE PRACTITIONER

## 2022-06-20 PROCEDURE — 99215 OFFICE O/P EST HI 40 MIN: CPT | Mod: S$GLB,,, | Performed by: NURSE PRACTITIONER

## 2022-06-20 PROCEDURE — 99215 PR OFFICE/OUTPT VISIT, EST, LEVL V, 40-54 MIN: ICD-10-PCS | Mod: S$GLB,,, | Performed by: NURSE PRACTITIONER

## 2022-06-20 NOTE — Clinical Note
Hi,   This a BRCA2+ patient of mine who needs to establish care with an eye doctor for annual eye exams given the associated risk for melanoma of the eye (she also wears glasses).  Can you please contact her to schedule an appt?  Referral is in.   Of note, she currently lives in [place].   Please let me know if any further info is needed.   Thanks!  Salomon Meyers NP  Cancer Genetics   Hematology/Oncology  [facility]  [phone]

## 2022-06-20 NOTE — TELEPHONE ENCOUNTER
----- Message from Marly Hamilton MA sent at 6/20/2022  3:39 PM CDT -----  Hey, would y'all be able to schedule this pt in [place] since it would be closer for her? Thanks!  ----- Message -----  From: Salomon Meyers DNP  Sent: 6/20/2022   3:33 PM CDT  To: [facility] Clinical Support    Hi,     This a BRCA2+ patient of mine who needs to establish care with an eye doctor for annual eye exams given the associated risk for melanoma of the eye (she also wears glasses).  Can you please contact her to schedule an appt?  Referral is in.     Of note, she currently lives in [place].     Please let me know if any further info is needed.     Thanks!   Salomon Meyers NP   Cancer Genetics     Hematology/Oncology   [facility]   [phone]

## 2022-06-20 NOTE — Clinical Note
Hi,  This a BRCA2+ patient of mine who needs to establish care with a derm provider for annual TBSEs.  Can you please contact her to schedule an appt?  Referral is in.  Of note, she currently lives in [place].  Please let me know if any further info is needed.  Thanks! Salomon Meyers NP Cancer Genetics  Hematology/Oncology [facility] [phone]

## 2022-06-20 NOTE — PROGRESS NOTES
"MRI Safety Information  In case undergoing MRI(s) in near future for VHL gene mutation    Patient denies:  1. Having pacemaker or defibrillator.  2. Having an aneurysm.  3. Having a history of diabetes, kidney disease, or hypertension.  4. Any chance of pregnancy.  5. Wearing a medicated skin patch.    Patient endorses:  1. With regard to having any implant/foreing object in her body, had "cuffs" created on both outer ears at around age 15, and she's unsure if any implant was used.  2. Having claustrophobia- May need "a little" PO sedation for any long-lasting MRI.    Advised patient to notify me of any changes to the above.    "

## 2022-06-21 ENCOUNTER — TELEPHONE (OUTPATIENT)
Dept: HEMATOLOGY/ONCOLOGY | Facility: CLINIC | Age: 22
End: 2022-06-21
Payer: COMMERCIAL

## 2022-06-21 ENCOUNTER — PATIENT MESSAGE (OUTPATIENT)
Dept: HEMATOLOGY/ONCOLOGY | Facility: CLINIC | Age: 22
End: 2022-06-21
Payer: COMMERCIAL

## 2022-06-21 DIAGNOSIS — Z15.89 MONOALLELIC MUTATION OF VHL GENE: Primary | ICD-10-CM

## 2022-06-21 DIAGNOSIS — H93.13 TINNITUS, SUBJECTIVE, BILATERAL: ICD-10-CM

## 2022-06-21 NOTE — Clinical Note
Hi,  Can you please reach out to the pt to schedule a visit with an audiologist?  She has a VHL gene mutation and therefore needs to have an audiogram.  VHL mutations in general are associated with endolymphatic sac tumors.  While there's not compelling evidence of an association with ELSTs with this pt's particular VHL variant, want to obtain a baseline audiogram.  Please let me know if any questions or if additional info is needed.  Thank you, Salomon Meyers NP Cancer Genetics  Hematology/Oncology [facility] [phone]

## 2022-06-21 NOTE — PROGRESS NOTES
ROBIN Lopez MD  Cc: Salomon Meyers DNP  Thank you for this.     I'm going to do a broad MRI to include brain down to pelvis - Reached out to Jacquelyn Cardenas as she's assisted me with this in the past.     I will also make sure that the PCP is aware of the VHL mutation's implications once she gets scheduled for an appt to establish care (referred her yesterday).     Thanks again!   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 In general, these are associated w/ VHL disease--i.e., risk for hemangioblastoma, clear cell RCC, pheochromocytoma, paraganglioma, retinal angioma, endolymphatic sac tumor, cystadenomas of the broad ligament, and pancreatic cystadenoma/neuroendocrine tumor/cysts; HOWEVER, her particular variant has very little, if any, documented association with VHL disease (I reviewed literature and found one piece stating that two people have had hemangioblastoma but most people with this variant don't have VHL disease), and rather the variant is considered pathogenic because of its associated recessive condition, erythrocytosis, which this pt doesn't have.       Mgmt of VHL disease is significant:   - q2yr MRI of brain, spine, abdomen, both w/ and w/o contrast;   - q2yr assessment of either plasma or urinary metanephrines;   - q2yr audiogram with an audiologist;   - q6-12mo specialized eye exam.       The pt is amenable to whatever we recommend, and I was thinking it might be reasonable to obtain baselines of all the above, particularly because if we find something it could raise suspicion  that maybe her VHL variant is pathogenic for VHL disease, but if nothing found then we could stop screening there.  Would appreciate your thoughts on this.       Thanks!   Salomon

## 2022-06-21 NOTE — TELEPHONE ENCOUNTER
Left vm to schedule Genetic blood work per Salomon mary. Provided call back number.    ----- Message from Salomon Meyers DNP sent at 6/21/2022 10:49 AM CDT -----  Tova, please reach out to pt to schedule her plasma metanephrines blood work.  Order is in.  Thanks!

## 2022-06-21 NOTE — Clinical Note
Tova, please reach out to pt to schedule her plasma metanephrines blood work.  Order is in.  Thanks!

## 2022-07-18 ENCOUNTER — PATIENT MESSAGE (OUTPATIENT)
Dept: HEMATOLOGY/ONCOLOGY | Facility: CLINIC | Age: 22
End: 2022-07-18
Payer: COMMERCIAL

## 2022-08-12 ENCOUNTER — OFFICE VISIT (OUTPATIENT)
Dept: OPHTHALMOLOGY | Facility: CLINIC | Age: 22
End: 2022-08-12
Payer: COMMERCIAL

## 2022-08-12 DIAGNOSIS — Z15.09 MONOALLELIC MUTATION OF BRCA2 GENE: ICD-10-CM

## 2022-08-12 DIAGNOSIS — Z15.01 MONOALLELIC MUTATION OF BRCA2 GENE: ICD-10-CM

## 2022-08-12 DIAGNOSIS — Z15.89 MONOALLELIC MUTATION OF VHL GENE: ICD-10-CM

## 2022-08-12 DIAGNOSIS — Z13.5 SCREENING FOR EYE CONDITION: Primary | ICD-10-CM

## 2022-08-12 PROCEDURE — 1160F PR REVIEW ALL MEDS BY PRESCRIBER/CLIN PHARMACIST DOCUMENTED: ICD-10-PCS | Mod: CPTII,S$GLB,, | Performed by: OPHTHALMOLOGY

## 2022-08-12 PROCEDURE — 92004 COMPRE OPH EXAM NEW PT 1/>: CPT | Mod: S$GLB,,, | Performed by: OPHTHALMOLOGY

## 2022-08-12 PROCEDURE — 99999 PR PBB SHADOW E&M-EST. PATIENT-LVL III: CPT | Mod: PBBFAC,,, | Performed by: OPHTHALMOLOGY

## 2022-08-12 PROCEDURE — 1159F PR MEDICATION LIST DOCUMENTED IN MEDICAL RECORD: ICD-10-PCS | Mod: CPTII,S$GLB,, | Performed by: OPHTHALMOLOGY

## 2022-08-12 PROCEDURE — 99999 PR PBB SHADOW E&M-EST. PATIENT-LVL III: ICD-10-PCS | Mod: PBBFAC,,, | Performed by: OPHTHALMOLOGY

## 2022-08-12 PROCEDURE — 1160F RVW MEDS BY RX/DR IN RCRD: CPT | Mod: CPTII,S$GLB,, | Performed by: OPHTHALMOLOGY

## 2022-08-12 PROCEDURE — 92004 PR EYE EXAM, NEW PATIENT,COMPREHESV: ICD-10-PCS | Mod: S$GLB,,, | Performed by: OPHTHALMOLOGY

## 2022-08-12 PROCEDURE — 1159F MED LIST DOCD IN RCRD: CPT | Mod: CPTII,S$GLB,, | Performed by: OPHTHALMOLOGY

## 2022-08-15 DIAGNOSIS — Z15.89 MONOALLELIC MUTATION OF VHL GENE: Primary | ICD-10-CM

## 2022-08-15 NOTE — PROGRESS NOTES
Per CP Ori, RT:  brain order, abdomen order and cervical thoracic and lumbar order. 5 total orders in 5 consecutive time slots

## 2022-08-16 NOTE — PROGRESS NOTES
"  Reason For Consultation:   Increased lifetime risk of breast cancer    Referring Provider:   Salomon Meyers, DNP  [address] [place] [address]    Records Obtained: Records of the patients history including those obtained from the referring provider were reviewed and summarized in detail.    HPI:   Dinorah Deleon presents for consultation of increased risk of breast cancer. She is premenopausal. She is BRCA 2 positive.       Today, Feels good and no complaints.   No breast concerns.    High Risk Breast cancer specific history:  - Age: 21 y.o.   - Height:  5'6"  - Weight: 57.7kg  - Breast density per BI-RADS:  Unknown   - Age at menarche:  13  - Number of pregnancies: ;   - History of breast feeding: No.   - Age at menopause, if applicable:  N/A.   -Uterus and ovaries intact: Yes  - HRT: No; OCPs x ~4 years      - Genetic testing: Yes - BRCA 2 positive  - Personal history of cancer: No  - Previous chest radiation exposure between ages 10-30 years old: No  - Personal history of breast biopsy: No  - Ashkenazi Gnosticism Inheritance: No  - Family history of cancer:      Mom Breast cancer diagnosed 42; alive 44 - BRCA2+   Maternal Grandmother Breast Cancer diagsnoed 52; alive 63 - BRCA2+   Maternal Aunt BRCA2+        Social History:  Tobacco use:  None  Alcohol use:  None  Exercise regimen: 1-2 times a week, 30 minutes       SEE CALCULATED RISK BELOW.     Past Medical   Past Medical History:   Diagnosis Date    Wears glasses     To drive/use computer     Patient Active Problem List   Diagnosis    Monoallelic mutation of BRCA2 gene    Monoallelic mutation of VHL gene    Endometriosis    Asymmetrical breasts     Social History   Social History     Tobacco Use    Smoking status: Never Smoker    Smokeless tobacco: Never Used   Substance Use Topics    Alcohol use: Never     Family History  Family History   Problem Relation Age of Onset    Cancer Other         possibly    Breast cancer Mother 42        TNBC, " "unilat    BRCA 1/2 Mother         BRCA2 [id number] ([id number]*) heterozygous, [facility], 2021    Breast cancer Maternal Grandmother 52    BRCA 1/2 Maternal Grandmother         BRCA2+    Other Other         maybe underwent genetic testing?    Cancer Other         abdominal, NOS; 20s    Other Other         suspected panc CA     Medications    Current Outpatient Medications:     meloxicam (MOBIC) 7.5 MG tablet, TAKE 1 TABLET BY MOUTH EVERY DAY (Patient not taking: Reported on 8/12/2022), Disp: 30 tablet, Rfl: 0    norgestimate-ethinyl estradioL (ORTHO TRI-CYCLEN,TRI-SPRINTEC) 0.18/0.215/0.25 mg-35 mcg (28) tablet, , Disp: , Rfl:     sertraline (ZOLOFT) 25 MG tablet, Take 25 mg by mouth once daily., Disp: , Rfl:     sertraline (ZOLOFT) 50 MG tablet, Take 50 mg by mouth once daily., Disp: , Rfl:   Allergies  Review of patient's allergies indicates:  No Known Allergies    Review of Systems       See above   Review of Systems  Review of Systems  All other systems reviewed and are negative.    Objective:      Vitals:   Vitals:    08/30/22 1100   BP: 123/73   Pulse: 97   Resp: 18   Temp: 97.9 °F (36.6 °C)   TempSrc: Oral   SpO2: 100%   Weight: 57.7 kg (127 lb 3.3 oz)   Height: 5' 6" (1.676 m)     BMI: Body mass index is 20.53 kg/m².   Body surface area is 1.64 meters squared.    Physical Exam  Vitals and nursing note reviewed.   Constitutional:       General: She is not in acute distress.     Appearance: Normal appearance. She is well-developed.   HENT:      Head: Normocephalic.      Mouth/Throat:      Pharynx: No oropharyngeal exudate.   Eyes:      Pupils: Pupils are equal, round, and reactive to light.   Cardiovascular:      Rate and Rhythm: Normal rate and regular rhythm.      Heart sounds: Normal heart sounds.   Pulmonary:      Effort: Pulmonary effort is normal.      Breath sounds: Normal breath sounds.   Chest:   Breasts:     Right: Normal.      Left: Normal.   Musculoskeletal:      Cervical back: Normal range of " motion.   Lymphadenopathy:      Cervical: No cervical adenopathy.      Upper Body:      Right upper body: No supraclavicular or axillary adenopathy.      Left upper body: No supraclavicular or axillary adenopathy.   Skin:     General: Skin is warm and dry.      Findings: No rash.   Neurological:      Mental Status: She is alert and oriented to person, place, and time.   Psychiatric:         Behavior: Behavior normal.         Laboratory Data: reviewed most recent   Imaging: reviewed most recent        Assessment:     1. Monoallelic mutation of BRCA2 gene    2. Monoallelic mutation of VHL gene        1. Increased risk of breast cancer   * Tyrer-Cuzick (TC) lifetime risk of 81.7%. We discussed that TC score will categorize your lifetime risk of being diagnosed with breast cancer. Categories are as such: Average risk <15%, Intermediate risk 15-19%, and High risk > or = to 20%.    * Unable to calculate due to age        Risk factors are categorized into 2 groups: Modifiable and Non-modifiable. Modifiable risk factors include use of hormones, alcohol, smoking, diet and exercise. Non-modifiable risk factors include breast density, genetics, chest radiation, previous pregnancies, age of first period, and age of menopause.       * Reviewed Lifestyle modifications which have shown benefit:  Limit alcohol consumption to less than 1 drink per day (1 ounce liquor, 6 oz wine, 8 oz beer)  Avoid smoking.  Exercise at least 150 minutes per week of moderate intensity aerobic activity or at least 75 minutes of vigorous activity. Exercise can lower the relative risk of breast cancer by ~18-20%.  Maintain healthy weight and avoid post-menopausal weight gain. Avoid processed foods and eat more lean proteins, fruits and vegetables.                   * Reviewed future screening:   Semiannual (every 6 months) CBE (clinical breast exam).   Annual Breast MRI alternating with an annual MMG. if TC 20% or greater.           Women who have  received previous radiation therapy to the chest for treatment of Hodgkin lymphoma;  Women with gene mutations in BRCA1 or BRCA2, or those who are known to have certain genetic syndromes that increase risk for breast cancer; Women of age <35 or >85.          Plan:     Patient has opted out chemoprevention but will call in the future if she wishes to pursue. Not offered due to age  Patient elects to proceed with alternating annual mammogram and annual breast MRI along with semiannual CBEs.Upong turning 27 for MRIs and 32 for mammograms; if she has not had a bilateral mastectomy   Patient will follow up with PCP or GYN for one semiannual CBE along with annual mammogram at age 32 if she has not had a bilateral mastectomy yet and will RTC here for one semiannual CBE along with annual breast MRI at age 27 if she has not had a bilateral mastectomy yet.  Lifestyle modifications as detailed above.   5.   Encouraged breast awareness, including monthly breast self-exams.   6.   Established with Salomon Meyers NP  for Genetic counseling.       RTC in 1 year if no bilateral mastectomy to see me either at [facility] or on 3rd floor.    Questions were encouraged and answered to patient's satisfaction, and patient verbalized understanding of information and agreement with the plan. Advised patient to RTC with any interval changes or concerns.          Patient is in agreement with the proposed treatment plan. All questions were answered to the patient's satisfaction. Patient knows to call clinic for any new or worsening symptoms and if anything is needed before the next clinic visit.          Malou Aldrich, FNP-C  Hematology & Medical Oncology   [facility] [place] [address]  ph. [phone]  Fax. [phone]    Collaborating physician, Dr. Casey.    Total time spent with the patient: 30 minutes, 20 minutes of face to face consultation and 10 minutes of chart review and coordination of care, on the day of the visit.  This includes face to face time and non-face to face time preparing to see the patient (eg, review of tests), obtaining and/or reviewing separately obtained history, documenting clinical information in the electronic or other health record, independently interpreting resultsand communicating results to the patient/family/caregiver, or care coordination.    Route Chart for Scheduling    Med Onc Chart Routing      Follow up with physician    Follow up with [facility] 1 year.   Infusion scheduling note    Injection scheduling note    Labs    Imaging    Pharmacy appointment    Other referrals

## 2022-08-17 ENCOUNTER — TELEPHONE (OUTPATIENT)
Dept: HEMATOLOGY/ONCOLOGY | Facility: CLINIC | Age: 22
End: 2022-08-17
Payer: COMMERCIAL

## 2022-08-17 DIAGNOSIS — F41.9 ANXIETY DUE TO INVASIVE PROCEDURE: Primary | ICD-10-CM

## 2022-08-17 RX ORDER — DIAZEPAM 5 MG/1
5 TABLET ORAL
Qty: 2 TABLET | Refills: 2 | Status: SHIPPED | OUTPATIENT
Start: 2022-08-17 | End: 2022-08-18 | Stop reason: SDUPTHER

## 2022-08-17 NOTE — TELEPHONE ENCOUNTER
Talked to pt about contents of msg sent same date.  Pt confirmed no known drug allergies or other allergies.  Pt requested pharmacy be changed, knows not to  Rx from [facility].

## 2022-08-18 DIAGNOSIS — F41.9 ANXIETY DUE TO INVASIVE PROCEDURE: ICD-10-CM

## 2022-08-18 RX ORDER — DIAZEPAM 5 MG/1
5 TABLET ORAL
Qty: 2 TABLET | Refills: 2 | Status: SHIPPED | OUTPATIENT
Start: 2022-08-18 | End: 2023-08-18

## 2022-08-30 ENCOUNTER — OFFICE VISIT (OUTPATIENT)
Dept: HEMATOLOGY/ONCOLOGY | Facility: CLINIC | Age: 22
End: 2022-08-30
Payer: COMMERCIAL

## 2022-08-30 VITALS
SYSTOLIC BLOOD PRESSURE: 123 MMHG | BODY MASS INDEX: 20.44 KG/M2 | WEIGHT: 127.19 LBS | TEMPERATURE: 98 F | HEART RATE: 97 BPM | OXYGEN SATURATION: 100 % | HEIGHT: 66 IN | DIASTOLIC BLOOD PRESSURE: 73 MMHG | RESPIRATION RATE: 18 BRPM

## 2022-08-30 DIAGNOSIS — N64.89 ASYMMETRICAL BREASTS: ICD-10-CM

## 2022-08-30 DIAGNOSIS — Z15.89 MONOALLELIC MUTATION OF VHL GENE: ICD-10-CM

## 2022-08-30 DIAGNOSIS — Z15.09 MONOALLELIC MUTATION OF BRCA2 GENE: Primary | ICD-10-CM

## 2022-08-30 DIAGNOSIS — Z15.01 MONOALLELIC MUTATION OF BRCA2 GENE: Primary | ICD-10-CM

## 2022-08-30 PROCEDURE — 3074F SYST BP LT 130 MM HG: CPT | Mod: CPTII,S$GLB,ICN, | Performed by: NURSE PRACTITIONER

## 2022-08-30 PROCEDURE — 1159F MED LIST DOCD IN RCRD: CPT | Mod: CPTII,S$GLB,ICN, | Performed by: NURSE PRACTITIONER

## 2022-08-30 PROCEDURE — 3074F PR MOST RECENT SYSTOLIC BLOOD PRESSURE < 130 MM HG: ICD-10-PCS | Mod: CPTII,S$GLB,ICN, | Performed by: NURSE PRACTITIONER

## 2022-08-30 PROCEDURE — 99999 PR PBB SHADOW E&M-EST. PATIENT-LVL IV: CPT | Mod: PBBFAC,,, | Performed by: NURSE PRACTITIONER

## 2022-08-30 PROCEDURE — 3078F PR MOST RECENT DIASTOLIC BLOOD PRESSURE < 80 MM HG: ICD-10-PCS | Mod: CPTII,S$GLB,ICN, | Performed by: NURSE PRACTITIONER

## 2022-08-30 PROCEDURE — 3008F BODY MASS INDEX DOCD: CPT | Mod: CPTII,S$GLB,ICN, | Performed by: NURSE PRACTITIONER

## 2022-08-30 PROCEDURE — 1160F RVW MEDS BY RX/DR IN RCRD: CPT | Mod: CPTII,S$GLB,ICN, | Performed by: NURSE PRACTITIONER

## 2022-08-30 PROCEDURE — 1159F PR MEDICATION LIST DOCUMENTED IN MEDICAL RECORD: ICD-10-PCS | Mod: CPTII,S$GLB,ICN, | Performed by: NURSE PRACTITIONER

## 2022-08-30 PROCEDURE — 3008F PR BODY MASS INDEX (BMI) DOCUMENTED: ICD-10-PCS | Mod: CPTII,S$GLB,ICN, | Performed by: NURSE PRACTITIONER

## 2022-08-30 PROCEDURE — 99214 OFFICE O/P EST MOD 30 MIN: CPT | Mod: S$GLB,ICN,, | Performed by: NURSE PRACTITIONER

## 2022-08-30 PROCEDURE — 99214 PR OFFICE/OUTPT VISIT, EST, LEVL IV, 30-39 MIN: ICD-10-PCS | Mod: S$GLB,ICN,, | Performed by: NURSE PRACTITIONER

## 2022-08-30 PROCEDURE — 99999 PR PBB SHADOW E&M-EST. PATIENT-LVL IV: ICD-10-PCS | Mod: PBBFAC,,, | Performed by: NURSE PRACTITIONER

## 2022-08-30 PROCEDURE — 1160F PR REVIEW ALL MEDS BY PRESCRIBER/CLIN PHARMACIST DOCUMENTED: ICD-10-PCS | Mod: CPTII,S$GLB,ICN, | Performed by: NURSE PRACTITIONER

## 2022-08-30 PROCEDURE — 3078F DIAST BP <80 MM HG: CPT | Mod: CPTII,S$GLB,ICN, | Performed by: NURSE PRACTITIONER

## 2022-12-27 ENCOUNTER — PATIENT MESSAGE (OUTPATIENT)
Dept: HEMATOLOGY/ONCOLOGY | Facility: CLINIC | Age: 22
End: 2022-12-27
Payer: COMMERCIAL

## 2023-01-05 ENCOUNTER — PATIENT MESSAGE (OUTPATIENT)
Dept: HEMATOLOGY/ONCOLOGY | Facility: CLINIC | Age: 23
End: 2023-01-05
Payer: COMMERCIAL

## 2023-01-05 DIAGNOSIS — Z15.09 MONOALLELIC MUTATION OF BRCA2 GENE: Primary | ICD-10-CM

## 2023-01-05 DIAGNOSIS — Z15.01 MONOALLELIC MUTATION OF BRCA2 GENE: Primary | ICD-10-CM

## 2023-04-02 ENCOUNTER — LAB VISIT (OUTPATIENT)
Dept: LAB | Facility: HOSPITAL | Age: 23
End: 2023-04-02
Attending: INTERNAL MEDICINE
Payer: OTHER GOVERNMENT

## 2023-04-02 DIAGNOSIS — R94.31 NONSPECIFIC ABNORMAL ELECTROCARDIOGRAM (ECG) (EKG): ICD-10-CM

## 2023-04-02 DIAGNOSIS — I47.10 PAROXYSMAL SUPRAVENTRICULAR TACHYCARDIA: Primary | ICD-10-CM

## 2023-04-02 DIAGNOSIS — R00.2 PALPITATIONS: ICD-10-CM

## 2023-04-02 DIAGNOSIS — R06.02 SHORTNESS OF BREATH: ICD-10-CM

## 2023-04-02 LAB
ALBUMIN SERPL BCP-MCNC: 3.3 G/DL (ref 3.5–5.2)
ALP SERPL-CCNC: 89 U/L (ref 55–135)
ALT SERPL W/O P-5'-P-CCNC: 16 U/L (ref 10–44)
ANION GAP SERPL CALC-SCNC: 11 MMOL/L (ref 8–16)
AST SERPL-CCNC: 19 U/L (ref 10–40)
BILIRUB SERPL-MCNC: 0.2 MG/DL (ref 0.1–1)
BUN SERPL-MCNC: 8 MG/DL (ref 6–20)
CALCIUM SERPL-MCNC: 9.1 MG/DL (ref 8.7–10.5)
CHLORIDE SERPL-SCNC: 109 MMOL/L (ref 95–110)
CHOLEST SERPL-MCNC: 256 MG/DL (ref 120–199)
CHOLEST/HDLC SERPL: 4.7 {RATIO} (ref 2–5)
CK SERPL-CCNC: 81 U/L (ref 20–180)
CO2 SERPL-SCNC: 22 MMOL/L (ref 23–29)
CREAT SERPL-MCNC: 0.8 MG/DL (ref 0.5–1.4)
ERYTHROCYTE [DISTWIDTH] IN BLOOD BY AUTOMATED COUNT: 12.1 % (ref 11.5–14.5)
EST. GFR  (NO RACE VARIABLE): >60 ML/MIN/1.73 M^2
ESTIMATED AVG GLUCOSE: 94 MG/DL (ref 68–131)
GLUCOSE SERPL-MCNC: 104 MG/DL (ref 70–110)
HBA1C MFR BLD: 4.9 % (ref 4–5.6)
HCT VFR BLD AUTO: 41.4 % (ref 37–48.5)
HDLC SERPL-MCNC: 55 MG/DL (ref 40–75)
HDLC SERPL: 21.5 % (ref 20–50)
HGB BLD-MCNC: 13.5 G/DL (ref 12–16)
LDLC SERPL CALC-MCNC: 180.8 MG/DL (ref 63–159)
MCH RBC QN AUTO: 28.1 PG (ref 27–31)
MCHC RBC AUTO-ENTMCNC: 32.6 G/DL (ref 32–36)
MCV RBC AUTO: 86 FL (ref 82–98)
NONHDLC SERPL-MCNC: 201 MG/DL
PLATELET # BLD AUTO: 337 K/UL (ref 150–450)
PMV BLD AUTO: 8.3 FL (ref 9.2–12.9)
POTASSIUM SERPL-SCNC: 4 MMOL/L (ref 3.5–5.1)
PROT SERPL-MCNC: 7.5 G/DL (ref 6–8.4)
RBC # BLD AUTO: 4.8 M/UL (ref 4–5.4)
SODIUM SERPL-SCNC: 142 MMOL/L (ref 136–145)
T4 FREE SERPL-MCNC: 0.94 NG/DL (ref 0.71–1.51)
TRIGL SERPL-MCNC: 101 MG/DL (ref 30–150)
TSH SERPL DL<=0.005 MIU/L-ACNC: 4.21 UIU/ML (ref 0.4–4)
WBC # BLD AUTO: 12.53 K/UL (ref 3.9–12.7)

## 2023-04-02 PROCEDURE — 85027 COMPLETE CBC AUTOMATED: CPT | Performed by: INTERNAL MEDICINE

## 2023-04-02 PROCEDURE — 80053 COMPREHEN METABOLIC PANEL: CPT | Performed by: INTERNAL MEDICINE

## 2023-04-02 PROCEDURE — 80061 LIPID PANEL: CPT | Performed by: INTERNAL MEDICINE

## 2023-04-02 PROCEDURE — 84443 ASSAY THYROID STIM HORMONE: CPT | Performed by: INTERNAL MEDICINE

## 2023-04-02 PROCEDURE — 82550 ASSAY OF CK (CPK): CPT | Performed by: INTERNAL MEDICINE

## 2023-04-02 PROCEDURE — 84439 ASSAY OF FREE THYROXINE: CPT | Performed by: INTERNAL MEDICINE

## 2023-04-02 PROCEDURE — 36415 COLL VENOUS BLD VENIPUNCTURE: CPT | Performed by: INTERNAL MEDICINE

## 2023-04-02 PROCEDURE — 83036 HEMOGLOBIN GLYCOSYLATED A1C: CPT | Performed by: INTERNAL MEDICINE

## 2023-11-18 ENCOUNTER — PATIENT MESSAGE (OUTPATIENT)
Dept: HEMATOLOGY/ONCOLOGY | Facility: CLINIC | Age: 23
End: 2023-11-18
Payer: OTHER GOVERNMENT

## 2023-11-18 ENCOUNTER — DOCUMENTATION ONLY (OUTPATIENT)
Dept: HEMATOLOGY/ONCOLOGY | Facility: CLINIC | Age: 23
End: 2023-11-18
Payer: OTHER GOVERNMENT

## 2024-07-12 ENCOUNTER — DOCUMENTATION ONLY (OUTPATIENT)
Dept: HEMATOLOGY/ONCOLOGY | Facility: CLINIC | Age: 24
End: 2024-07-12
Payer: OTHER GOVERNMENT

## 2024-07-13 NOTE — PROGRESS NOTES
On 06/21/2022 I asked Jacquelyn Cardenas (ARTEMIO) how I should order a brain-to-pelvis MRI and get the patient scheduled for such, and ARTEMIO responded that she forwarded my inquiry to her supervisor so that he (Fam Rehman) could get the patient scheduled.  I subsequently asked ARTEMIO and ANTHONY Rehman what was needed on my end to get the MRI scheduled.

## 2024-08-08 ENCOUNTER — PATIENT MESSAGE (OUTPATIENT)
Dept: HEMATOLOGY/ONCOLOGY | Facility: CLINIC | Age: 24
End: 2024-08-08
Payer: OTHER GOVERNMENT

## 2024-08-08 ENCOUNTER — TELEPHONE (OUTPATIENT)
Dept: HEMATOLOGY/ONCOLOGY | Facility: CLINIC | Age: 24
End: 2024-08-08
Payer: OTHER GOVERNMENT

## 2025-03-21 NOTE — Clinical Note
Follow up in about 1 year (around 6/20/2023) for BRCA2 and VHL check-in. No Call. No Show. No Charge    Mindy Truong no showed 03/21/25 appointment , staff called and left message to reschedule appointment     Treatment Plan not due at this session.